# Patient Record
Sex: FEMALE | Race: WHITE | NOT HISPANIC OR LATINO | URBAN - METROPOLITAN AREA
[De-identification: names, ages, dates, MRNs, and addresses within clinical notes are randomized per-mention and may not be internally consistent; named-entity substitution may affect disease eponyms.]

---

## 2020-07-25 ENCOUNTER — TELEPHONE ENCOUNTER (OUTPATIENT)
Dept: URBAN - METROPOLITAN AREA CLINIC 13 | Facility: CLINIC | Age: 55
End: 2020-07-25

## 2020-07-26 ENCOUNTER — TELEPHONE ENCOUNTER (OUTPATIENT)
Dept: URBAN - METROPOLITAN AREA CLINIC 13 | Facility: CLINIC | Age: 55
End: 2020-07-26

## 2020-07-26 RX ORDER — ZOLPIDEM TARTRATE 10 MG/1
TAKE 1 TABLET AT BEDTIME AS NEEDED FOR SLEEP TABLET, FILM COATED ORAL
Qty: 15 | Refills: 0 | Status: ACTIVE | COMMUNITY

## 2020-07-26 RX ORDER — RANOLAZINE 500 MG/1
TAKE 1 TABLET EVERY 12 HOURS TABLET, FILM COATED, EXTENDED RELEASE ORAL
Refills: 0 | Status: ACTIVE | COMMUNITY

## 2020-07-26 RX ORDER — LISINOPRIL 10 MG/1
TAKE 1 TABLET DAILY TABLET ORAL
Refills: 0 | Status: ACTIVE | COMMUNITY

## 2020-07-26 RX ORDER — PANTOPRAZOLE SODIUM 40 MG
TAKE 1 CAPSULE DAILY TABLET, DELAYED RELEASE (ENTERIC COATED) ORAL
Qty: 30 | Refills: 3 | Status: ACTIVE | COMMUNITY

## 2020-07-26 RX ORDER — ROSUVASTATIN CALCIUM 40 MG/1
TAKE 1 TABLET DAILY TABLET, FILM COATED ORAL
Refills: 0 | Status: ACTIVE | COMMUNITY

## 2020-07-26 RX ORDER — LEVOTHYROXINE SODIUM 0.07 MG/1
TAKE 1 TABLET DAILY TABLET ORAL
Refills: 0 | Status: ACTIVE | COMMUNITY

## 2020-07-26 RX ORDER — POTASSIUM CHLORIDE 1500 MG/1
TAKE 1 TABLET DAILY TABLET, EXTENDED RELEASE ORAL
Qty: 14 | Refills: 0 | Status: ACTIVE | COMMUNITY

## 2020-07-26 RX ORDER — METOPROLOL TARTRATE 25 MG/1
TAKE 1 TABLET TWICE DAILY TABLET, FILM COATED ORAL
Refills: 0 | Status: ACTIVE | COMMUNITY

## 2020-07-26 RX ORDER — DOCUSATE SODIUM 100 MG/1
TAKE 1 CAPSULE TWICE DAILY AS NEEDED CAPSULE, LIQUID FILLED ORAL
Refills: 0 | Status: ACTIVE | COMMUNITY

## 2020-07-26 RX ORDER — GABAPENTIN 100 MG/1
TAKE 1 CAPSULE TWICE DAILY CAPSULE ORAL
Refills: 0 | Status: ACTIVE | COMMUNITY

## 2020-07-26 RX ORDER — POLYETHYLENE GLYCOL 3350, SODIUM CHLORIDE, SODIUM BICARBONATE AND POTASSIUM CHLORIDE WITH LEMON FLAVOR 420; 11.2; 5.72; 1.48 G/4L; G/4L; G/4L; G/4L
USE AS DIRECTED POWDER, FOR SOLUTION ORAL
Qty: 1 | Refills: 0 | Status: ACTIVE | COMMUNITY
Start: 2016-02-02

## 2020-07-26 RX ORDER — FUROSEMIDE 40 MG/1
TAKE 1 TABLET TWICE DAILY TABLET ORAL
Qty: 60 | Refills: 3 | Status: ACTIVE | COMMUNITY

## 2020-07-26 RX ORDER — MORPHINE SULFATE 20 MG/5ML
USE AS DIRECTED SOLUTION ORAL
Refills: 0 | Status: ACTIVE | COMMUNITY

## 2020-07-26 RX ORDER — NITROGLYCERIN 0.4 MG/1
DISSOLVE 1 TABLET UNDER THE TONGUE AS NEEDED FOR CHEST PAIN TABLET SUBLINGUAL
Refills: 0 | Status: ACTIVE | COMMUNITY

## 2020-07-26 RX ORDER — CLOPIDOGREL BISULFATE 75 MG
TAKE 1 TABLET DAILY TABLET ORAL
Qty: 30 | Refills: 0 | Status: ACTIVE | COMMUNITY

## 2020-07-26 RX ORDER — CITALOPRAM 40 MG/1
TAKE 1 TABLET DAILY TABLET, FILM COATED ORAL
Refills: 0 | Status: ACTIVE | COMMUNITY

## 2020-07-26 RX ORDER — DICYCLOMINE HYDROCHLORIDE 10 MG/1
TAKE 1 CAPSULE AS NEEDED FOR ABDOMINAL PAIN UP TO 4 TIMES DAILY CAPSULE ORAL
Qty: 60 | Refills: 6 | Status: ACTIVE | COMMUNITY
Start: 2016-02-02

## 2023-11-01 ENCOUNTER — OFFICE VISIT (OUTPATIENT)
Dept: URBAN - METROPOLITAN AREA CLINIC 113 | Facility: CLINIC | Age: 58
End: 2023-11-01

## 2023-12-26 ENCOUNTER — OFFICE VISIT (OUTPATIENT)
Dept: URBAN - METROPOLITAN AREA CLINIC 113 | Facility: CLINIC | Age: 58
End: 2023-12-26

## 2024-01-04 ENCOUNTER — OFFICE VISIT (OUTPATIENT)
Dept: URBAN - METROPOLITAN AREA CLINIC 113 | Facility: CLINIC | Age: 59
End: 2024-01-04

## 2024-01-31 ENCOUNTER — DASHBOARD ENCOUNTERS (OUTPATIENT)
Age: 59
End: 2024-01-31

## 2024-01-31 ENCOUNTER — OFFICE VISIT (OUTPATIENT)
Dept: URBAN - METROPOLITAN AREA CLINIC 113 | Facility: CLINIC | Age: 59
End: 2024-01-31
Payer: MEDICARE

## 2024-01-31 VITALS
WEIGHT: 160 LBS | BODY MASS INDEX: 28.35 KG/M2 | HEART RATE: 52 BPM | DIASTOLIC BLOOD PRESSURE: 54 MMHG | SYSTOLIC BLOOD PRESSURE: 119 MMHG | TEMPERATURE: 97 F | RESPIRATION RATE: 16 BRPM | HEIGHT: 63 IN

## 2024-01-31 DIAGNOSIS — A04.8 BACTERIAL INFECTION DUE TO H. PYLORI: ICD-10-CM

## 2024-01-31 DIAGNOSIS — R74.8 ABNORMAL LIVER ENZYMES: ICD-10-CM

## 2024-01-31 DIAGNOSIS — K71.9 DRUG-INDUCED INJURY OF LIVER: ICD-10-CM

## 2024-01-31 DIAGNOSIS — R11.2 NAUSEA AND VOMITING IN ADULT: ICD-10-CM

## 2024-01-31 PROBLEM — 427399008: Status: ACTIVE | Noted: 2024-01-31

## 2024-01-31 PROCEDURE — 99204 OFFICE O/P NEW MOD 45 MIN: CPT | Performed by: STUDENT IN AN ORGANIZED HEALTH CARE EDUCATION/TRAINING PROGRAM

## 2024-01-31 RX ORDER — DOCUSATE SODIUM 100 MG/1
TAKE 1 CAPSULE TWICE DAILY AS NEEDED CAPSULE, LIQUID FILLED ORAL
Refills: 0 | Status: ACTIVE | COMMUNITY

## 2024-01-31 RX ORDER — ZOLPIDEM TARTRATE 10 MG/1
TAKE 1 TABLET AT BEDTIME AS NEEDED FOR SLEEP TABLET, FILM COATED ORAL
Qty: 15 | Refills: 0 | Status: ACTIVE | COMMUNITY

## 2024-01-31 RX ORDER — FUROSEMIDE 40 MG/1
TAKE 1 TABLET TWICE DAILY TABLET ORAL
Qty: 60 | Refills: 3 | Status: ACTIVE | COMMUNITY

## 2024-01-31 RX ORDER — METOPROLOL TARTRATE 25 MG/1
TAKE 1 TABLET TWICE DAILY TABLET, FILM COATED ORAL
Refills: 0 | Status: ACTIVE | COMMUNITY

## 2024-01-31 RX ORDER — ROSUVASTATIN CALCIUM 40 MG/1
TAKE 1 TABLET DAILY TABLET, FILM COATED ORAL
Refills: 0 | Status: ACTIVE | COMMUNITY

## 2024-01-31 RX ORDER — MORPHINE SULFATE 20 MG/5ML
USE AS DIRECTED SOLUTION ORAL
Refills: 0 | Status: ACTIVE | COMMUNITY

## 2024-01-31 RX ORDER — POLYETHYLENE GLYCOL 3350, SODIUM CHLORIDE, SODIUM BICARBONATE AND POTASSIUM CHLORIDE WITH LEMON FLAVOR 420; 11.2; 5.72; 1.48 G/4L; G/4L; G/4L; G/4L
USE AS DIRECTED POWDER, FOR SOLUTION ORAL
Qty: 1 | Refills: 0 | Status: ACTIVE | COMMUNITY
Start: 2016-02-02

## 2024-01-31 RX ORDER — LISINOPRIL 10 MG/1
TAKE 1 TABLET DAILY TABLET ORAL
Refills: 0 | Status: ACTIVE | COMMUNITY

## 2024-01-31 RX ORDER — NITROGLYCERIN 0.4 MG/1
DISSOLVE 1 TABLET UNDER THE TONGUE AS NEEDED FOR CHEST PAIN TABLET SUBLINGUAL
Refills: 0 | Status: ACTIVE | COMMUNITY

## 2024-01-31 RX ORDER — GABAPENTIN 100 MG/1
TAKE 1 CAPSULE TWICE DAILY CAPSULE ORAL
Refills: 0 | Status: ACTIVE | COMMUNITY

## 2024-01-31 RX ORDER — PANTOPRAZOLE SODIUM 40 MG
TAKE 1 CAPSULE DAILY TABLET, DELAYED RELEASE (ENTERIC COATED) ORAL
Qty: 30 | Refills: 3 | Status: ACTIVE | COMMUNITY

## 2024-01-31 RX ORDER — LEVOTHYROXINE SODIUM 0.07 MG/1
TAKE 1 TABLET DAILY TABLET ORAL
Refills: 0 | Status: ACTIVE | COMMUNITY

## 2024-01-31 RX ORDER — CITALOPRAM 40 MG/1
TAKE 1 TABLET DAILY TABLET, FILM COATED ORAL
Refills: 0 | Status: ACTIVE | COMMUNITY

## 2024-01-31 RX ORDER — CLOPIDOGREL BISULFATE 75 MG
TAKE 1 TABLET DAILY TABLET ORAL
Qty: 30 | Refills: 0 | Status: ACTIVE | COMMUNITY

## 2024-01-31 RX ORDER — POTASSIUM CHLORIDE 1500 MG/1
TAKE 1 TABLET DAILY TABLET, EXTENDED RELEASE ORAL
Qty: 14 | Refills: 0 | Status: ACTIVE | COMMUNITY

## 2024-01-31 RX ORDER — RANOLAZINE 500 MG/1
TAKE 1 TABLET EVERY 12 HOURS TABLET, FILM COATED, EXTENDED RELEASE ORAL
Refills: 0 | Status: ACTIVE | COMMUNITY

## 2024-01-31 RX ORDER — DICYCLOMINE HYDROCHLORIDE 10 MG/1
TAKE 1 CAPSULE AS NEEDED FOR ABDOMINAL PAIN UP TO 4 TIMES DAILY CAPSULE ORAL
Qty: 60 | Refills: 6 | Status: ACTIVE | COMMUNITY
Start: 2016-02-02

## 2024-01-31 NOTE — HPI-TODAY'S VISIT:
Initial visit 1 5134; PCP Dr. Jatin Ocasio Ms. Montano is a 58-year-old female being referred to the GI clinic for abnormal liver enzymes.  She has a past medical history significant for iron deficiency anemia, obesity, hypertension, hyperlipidemia, status post gastric bypass, chronic diastolic heart failure, type 2 diabetes mellitus, IBS-C.Abdominal ultrasound 8/14/2023 performed for abnormal liver enzymes: No acute findings, status post cholecystectomy, liver appeared normal on this exam.Labs 8/21/2023: MARQUES negative, ceruloplasmin low at 17.5, alpha-1 antitrypsin normal, anti-smooth muscle antibody normal, antimitochondrial antibody normal, ferritin 19, TSH normal, INR 1.5, , total bilirubin 0.5, direct bilirubin 0.25, alkaline phosphatase 59, , , hepatitis a total antibody negative, hepatitis B surface antigen negative, hepatitis B surface antibody nonreactive, hepatitis B core antibody total negative, hepatitis C antibody nonreactive.Labs 10/16/2023: Ferritin 114, hemoglobin A1c 5.9%, total bilirubin 0.6, alkaline phosphatase 58, AST 13, ALT 14, WBC 3.1, hemoglobin 11.4, platelet 101, .CT angiogram with coronary artery calcium scoring performed for chest pain: Partially visualized LIMA bypass graft is patent, bypass graft to the obtuse marginal branch is occluded, distal left main coronary artery left anterior descending artery and left circumflex artery are occluded. Colonoscopy 2/23/2016 performed for left upper quadrant abdominal pain and change in bowel habits: JANNA normal, perianal exam reveals skin tags, internal hemorrhoids on retroflexion, no other abnormalities.  Bowel prep was deemed good.  10-year follow-up recommended. Patient states that she had nausea that is postprandial approximately 10 minutes after eating..  She describes this symptom with all foods.  She does have associated vomiting quite frequently when this nausea hits.  She was tested for H. pylori through her PCP with a breath test that was positive.  She was started on clarithromycin triple therapy.  This has not been successful in resolving her symptoms.  She has not had any repeat H. pylori testing for eradication.  She has never had an endoscopy post her Kathy-en-Y gastric bypass surgery. He had a cardiac catheterization in December that she says did not require any further coronary intervention.  She is status post gastric bypass and coronary artery stenting. Her abnormal liver enzymes resolved after stopping Crestor and Zetia for 1 month and she has resumed Crestor and Zetia and has yet to have repeat liver enzyme testing.  She has plans for blood work upcoming.